# Patient Record
Sex: FEMALE | Race: WHITE
[De-identification: names, ages, dates, MRNs, and addresses within clinical notes are randomized per-mention and may not be internally consistent; named-entity substitution may affect disease eponyms.]

---

## 2017-10-03 ENCOUNTER — HOSPITAL ENCOUNTER (OUTPATIENT)
Dept: HOSPITAL 47 - RADUSWWP | Age: 57
End: 2017-10-03
Attending: INTERNAL MEDICINE
Payer: MEDICARE

## 2017-10-03 DIAGNOSIS — I87.2: Primary | ICD-10-CM

## 2017-10-03 PROCEDURE — 93970 EXTREMITY STUDY: CPT

## 2017-10-03 PROCEDURE — 93923 UPR/LXTR ART STDY 3+ LVLS: CPT

## 2017-10-03 NOTE — US
**Pt has bilateral leg pain and non-healing wounds bilateral lower legs x many years**

 

LOWER EXTREMITY VENOUS INSUFFICIENCY

 

SIDE PERFORMED:  Bilateral   

 

1)  Color flow is present and patency is documented in the following vessels.  No DVT or SVT is noted
.       

   EIV           

   Common Femoral Vein    

   Deep Femoral Vein

   Femoral Vein

   Popliteal Vein

   Proximal Calf Veins

 

   Greater Saph Vein  

   Upper Small Saph Vein  

 

2)  There is venous reflux noted at the following venous levels:  

 

**Right pop veins and right small saph veins

**Left pop veins and left small saph veins

 

**Probable Baker's Cyst right pop fossa= 4.8 x 1.0 x 3.4 cm**

 

 

 

IMPRESSION: 

1. Probable 4.8 cm Baker's cyst.

2. Venous reflux as noted above.

## 2017-10-11 NOTE — P.ARTDOP
Arterial Doppler





LOWER EXTREMITY ARTERIAL DOPPLER:





DATE OF SERVICE: 10/03/2017





Reason for study: Chronic bilateral leg ulcers.





Doppler waveforms: Multiphasic bilaterally throughout.





Pulse volume recording: Normal configuration.





Pressure gradients: None.





Ankle-brachial indices: Not done secondary to ulcerations 





Toe pressures: 126 on the right, 91 on the left





Impression: Suspect normal arterial study.  Some blunting of the digital 

waveform on the left which may be vasospastic phenomenon.  Cannot rule out mild 

distal disease but perfusion appears adequate for healing..

## 2020-03-17 ENCOUNTER — HOSPITAL ENCOUNTER (OUTPATIENT)
Dept: HOSPITAL 47 - RADUSWWP | Age: 60
Discharge: HOME | End: 2020-03-17
Attending: INTERNAL MEDICINE
Payer: MEDICARE

## 2020-03-17 DIAGNOSIS — Z88.8: ICD-10-CM

## 2020-03-17 DIAGNOSIS — E66.01: ICD-10-CM

## 2020-03-17 DIAGNOSIS — I87.313: ICD-10-CM

## 2020-03-17 DIAGNOSIS — E11.622: ICD-10-CM

## 2020-03-17 DIAGNOSIS — L97.902: ICD-10-CM

## 2020-03-17 DIAGNOSIS — I87.2: Primary | ICD-10-CM

## 2020-03-17 PROCEDURE — 93970 EXTREMITY STUDY: CPT

## 2020-03-17 PROCEDURE — 93922 UPR/L XTREMITY ART 2 LEVELS: CPT

## 2020-03-17 NOTE — US
"Chief Complaint   Patient presents with     Physical       Initial /82  Pulse 60  Temp 96.9  F (36.1  C) (Tympanic)  Resp 16  Ht 5' 8.5\" (1.74 m)  Wt 201 lb (91.2 kg)  BMI 30.12 kg/m2 Estimated body mass index is 30.12 kg/(m^2) as calculated from the following:    Height as of this encounter: 5' 8.5\" (1.74 m).    Weight as of this encounter: 201 lb (91.2 kg).  Medication Reconciliation: complete    Health Maintenance that is potentially due pending provider review:  Colonoscopy/FIT and AAA Screen    Possibly completing today per provider review.    Is there anyone who you would like to be able to receive your results? No  If yes have patient fill out [facility]    " LOWER EXTREMITY VENOUS INSUFFICIENCY

 

CLINICAL HISTORY: L97.902 NON PRESSURE CHRONIC ULCER.

 

SIDE PERFORMED:  Bilateral  

 

1)  Color flow is present and patency is documented in the following vessels.  No DVT or SVT is noted
.       

   EIV           

   Common Femoral Vein    

   Deep Femoral Vein

   Femoral Vein

   Popliteal Vein

   Proximal Calf Veins

 

   Greater Saph Vein  

   Upper Small Saph Vein  

 

2)  There is venous reflux noted at the following venous levels:  RT: CFV, POP, Small Saph. 

LT Small saph, POP.  

 

 

IMPRESSION: 

1. No sonographic evidence of deep venous thrombosis nor superficial venous thrombosis of the bilater
al visualized lower extremities.

2. Venous reflux noted in the right common femoral vein, popliteal vein, small saphenous vein and lef
t small saphenous vein as well as popliteal vein.

## 2020-03-18 NOTE — P.ARTDOP
Arterial Doppler





LOWER EXTREMITY ARTERIAL DOPPLER:





DATE OF SERVICE: 03/17/2020





Reason for study: Bilateral leg ulcers.





Doppler waveforms: Multiphasic bilaterally throughout.





Pulse volume recording: [].





Pressure gradients: None.





Ankle-brachial indices: Greater than 1 bilaterally.





Toe brachial indices: 0.9 to on the right, 0.88 on the left





Impression: Normal study.